# Patient Record
Sex: MALE | Race: WHITE | NOT HISPANIC OR LATINO
[De-identification: names, ages, dates, MRNs, and addresses within clinical notes are randomized per-mention and may not be internally consistent; named-entity substitution may affect disease eponyms.]

---

## 2021-12-09 ENCOUNTER — APPOINTMENT (OUTPATIENT)
Dept: ORTHOPEDIC SURGERY | Facility: CLINIC | Age: 56
End: 2021-12-09
Payer: COMMERCIAL

## 2021-12-09 VITALS
SYSTOLIC BLOOD PRESSURE: 113 MMHG | DIASTOLIC BLOOD PRESSURE: 77 MMHG | HEIGHT: 67 IN | HEART RATE: 82 BPM | BODY MASS INDEX: 42.38 KG/M2 | WEIGHT: 270 LBS

## 2021-12-09 DIAGNOSIS — Z86.79 PERSONAL HISTORY OF OTHER DISEASES OF THE CIRCULATORY SYSTEM: ICD-10-CM

## 2021-12-09 DIAGNOSIS — Z86.39 PERSONAL HISTORY OF OTHER ENDOCRINE, NUTRITIONAL AND METABOLIC DISEASE: ICD-10-CM

## 2021-12-09 DIAGNOSIS — Z82.61 FAMILY HISTORY OF ARTHRITIS: ICD-10-CM

## 2021-12-09 DIAGNOSIS — Z72.89 OTHER PROBLEMS RELATED TO LIFESTYLE: ICD-10-CM

## 2021-12-09 PROCEDURE — 73502 X-RAY EXAM HIP UNI 2-3 VIEWS: CPT | Mod: RT

## 2021-12-09 PROCEDURE — 20610 DRAIN/INJ JOINT/BURSA W/O US: CPT | Mod: RT

## 2021-12-09 PROCEDURE — 99204 OFFICE O/P NEW MOD 45 MIN: CPT | Mod: 25

## 2021-12-14 NOTE — PHYSICAL EXAM
[de-identified] : Left hip:\par Hip: Range of Motion in Degrees:\par 	                                 Claimant:	   Normal:	\par Flexion (Active) 	                 120 	   120-degrees	\par Flexion (Passive)	                 120	   120-degrees	\par Extension (Active)	                 -30	   -30-degrees	\par Extension (Passive)	 -30	   -30-degrees	\par Abduction (Active)	                 45-50	   26-59-evunomr	\par Abduction (Passive)	 45-50	   51-71-[id number]	\par Adduction (Active)  	 20-30	   10-64-[id number]	\par Adduction (Passive)	 20-30	   80-68-[id number]	\par Internal Rotation (Active) 	 35	   35-degrees	\par Internal Rotation (Passive)	 35	   35-degrees	\par External Rotation (Active)	 45	   45-degrees	\par External Rotation (Passive)	 45	   45-degrees	\par \par No tenderness with internal or external rotation or axial load.  No tenderness to palpation over the greater trochanter.  Negative Trendelenburg.  No tenderness with resisted abduction.  No weakness to flexion, extension, abduction or adduction.  No evidence of instability.  No motor or sensory deficits.  2+ DP and PT pulses.  Skin is intact.  No scars, rashes or lesions.  \par  \par Right hip:\par Hip: Range of Motion in Degrees:\par 	                                  Claimant:	Normal:	\par Flexion (Active) 	                  120 	                120-degrees	\par Flexion (Passive)	                  120	                120-degrees	\par Extension (Active)	                  -30	                -30-degrees	\par Extension (Passive)	  -30	                -30-degrees	\par Abduction (Active)	                  45-50	                41-19-[id number]	\par Abduction (Passive)	  45-50	                04-77-meqjnbv	\par Adduction (Active)	                  20-30	                81-71-bhqdwyo	\par Adduction (Passive)	  20-30	                69-74-ecebehq	\par Internal Rotation (Active) 	 35	                35-degrees	\par Internal Rotation (Passive)	 35	                35-degrees	\par External Rotation (Active)	 45	                45-degrees	\par External Rotation (Passive)	 45	                45-degrees	\par \par Hip abductor weakness.  Tenderness into the groin with internal and external rotation and axial load.  Point tenderness over the greater trochanter with pain with resisted abduction.  Negative Trendelenburg.  No tenderness with resisted abduction.  No weakness to flexion, extension,  adduction.  No evidence of instability.  No motor or sensory deficits.  2+ DP and PT pulses.  Skin is intact.  No scars, rashes or lesions.  \par \par He has some mild tenderness in the region of his SI joint.\par   [de-identified] : Gait: Slightly antalgic to antalgic gait.  Station: Normal  [de-identified] : Appearance: Well-developed, well-nourished male  in no acute distress.  [de-identified] : Radiographs, one to two views of the right hip, including pelvis, show mild degenerative change.

## 2021-12-14 NOTE — PROCEDURE
[de-identified] : Consent: \par At this time, I have recommended an injection to the right hip.  The risks and benefits of the procedure were discussed with the patient in detail.  Upon verbal consent of the patient, we proceeded with the injection as noted below.  \par \par Procedure:  \par After a sterile prep, the patient underwent an injection of 9 cc of 1% Lidocaine without epinephrine and 1 cc of Kenalog into the right hip.  The patient tolerated the procedure well.  There were no complications.

## 2021-12-14 NOTE — ADDENDUM
[FreeTextEntry1] : This note was written by Andra Fleming on 12/14/2021 acting as scribe for Justin Barboza III, MD

## 2021-12-14 NOTE — HISTORY OF PRESENT ILLNESS
[de-identified] : Khalif comes in today with complaints of pain to his right hip.  He points to his low back and lateral side of his hip and into his groin as the source of the pain.   The patient states the pain is constant.  The patient describes the pain as sharp and achy.  The patient states medication make the symptoms better while bending makes the symptoms worse.  [7] : the ailment interference is 7/10 [] : No

## 2021-12-14 NOTE — DISCUSSION/SUMMARY
[de-identified] : At this time I recommend ice and elevation for the for the osteoarthritis and trochanteric bursitis, right hip. He will be reassessed in one to two weeks.

## 2021-12-16 ENCOUNTER — APPOINTMENT (OUTPATIENT)
Dept: ORTHOPEDIC SURGERY | Facility: CLINIC | Age: 56
End: 2021-12-16
Payer: COMMERCIAL

## 2021-12-16 PROCEDURE — 99441: CPT

## 2022-01-13 ENCOUNTER — APPOINTMENT (OUTPATIENT)
Dept: ORTHOPEDIC SURGERY | Facility: CLINIC | Age: 57
End: 2022-01-13
Payer: COMMERCIAL

## 2022-01-13 PROCEDURE — 99441: CPT

## 2022-02-25 DIAGNOSIS — M70.61 TROCHANTERIC BURSITIS, RIGHT HIP: ICD-10-CM

## 2022-02-25 DIAGNOSIS — M16.11 UNILATERAL PRIMARY OSTEOARTHRITIS, RIGHT HIP: ICD-10-CM

## 2023-03-20 ENCOUNTER — OFFICE (OUTPATIENT)
Dept: URBAN - METROPOLITAN AREA CLINIC 102 | Facility: CLINIC | Age: 58
Setting detail: OPHTHALMOLOGY
End: 2023-03-20
Payer: COMMERCIAL

## 2023-03-20 DIAGNOSIS — H52.4: ICD-10-CM

## 2023-03-20 DIAGNOSIS — H25.13: ICD-10-CM

## 2023-03-20 DIAGNOSIS — H43.393: ICD-10-CM

## 2023-03-20 PROBLEM — H52.7 REFRACTIVE ERROR: Status: ACTIVE | Noted: 2023-03-20

## 2023-03-20 PROCEDURE — 92004 COMPRE OPH EXAM NEW PT 1/>: CPT | Performed by: OPHTHALMOLOGY

## 2023-03-20 PROCEDURE — 92020 GONIOSCOPY: CPT | Performed by: OPHTHALMOLOGY

## 2023-03-20 PROCEDURE — 92015 DETERMINE REFRACTIVE STATE: CPT | Performed by: OPHTHALMOLOGY

## 2023-03-20 ASSESSMENT — REFRACTION_AUTOREFRACTION
OS_SPHERE: +2.00
OD_CYLINDER: -0.50
OS_CYLINDER: -0.50
OD_AXIS: 113
OD_SPHERE: +2.00
OS_AXIS: 121

## 2023-03-20 ASSESSMENT — KERATOMETRY
OD_K1POWER_DIOPTERS: 47.25
OD_AXISANGLE_DEGREES: 084
METHOD_AUTO_MANUAL: AUTO
OS_K2POWER_DIOPTERS: 47.50
OS_K1POWER_DIOPTERS: 47.25
OS_AXISANGLE_DEGREES: 055
OD_K2POWER_DIOPTERS: 47.50

## 2023-03-20 ASSESSMENT — REFRACTION_MANIFEST
OS_ADD: +2.25
OD_VA1: 20/20
OD_SPHERE: +1.50
OD_AXIS: 110
OD_CYLINDER: -0.50
OU_VA: 20/20
OS_CYLINDER: -0.50
OS_AXIS: 110
OD_ADD: +2.25
OS_VA1: 20/20
OS_SPHERE: +1.75

## 2023-03-20 ASSESSMENT — REFRACTION_CURRENTRX
OD_OVR_VA: 20/
OD_ADD: +2.50
OS_OVR_VA: 20/
OD_SPHERE: +1.25
OS_SPHERE: +1.25
OS_ADD: +2.50
OD_AXIS: 180
OD_CYLINDER: 0.00
OS_AXIS: 177
OS_CYLINDER: -0.25

## 2023-03-20 ASSESSMENT — AXIALLENGTH_DERIVED
OD_AL: 21.6592
OD_AL: 21.8247
OS_AL: 21.7416
OS_AL: 21.6592

## 2023-03-20 ASSESSMENT — TONOMETRY
OS_IOP_MMHG: 20
OD_IOP_MMHG: 17
OD_IOP_MMHG: 21
OS_IOP_MMHG: 16

## 2023-03-20 ASSESSMENT — VISUAL ACUITY
OS_BCVA: 20/20
OD_BCVA: 20/20

## 2023-03-20 ASSESSMENT — CONFRONTATIONAL VISUAL FIELD TEST (CVF)
OS_FINDINGS: FULL
OD_FINDINGS: FULL

## 2023-03-20 ASSESSMENT — SPHEQUIV_DERIVED
OD_SPHEQUIV: 1.75
OS_SPHEQUIV: 1.75
OD_SPHEQUIV: 1.25
OS_SPHEQUIV: 1.5